# Patient Record
Sex: MALE | Race: AMERICAN INDIAN OR ALASKA NATIVE
[De-identification: names, ages, dates, MRNs, and addresses within clinical notes are randomized per-mention and may not be internally consistent; named-entity substitution may affect disease eponyms.]

---

## 2019-07-07 ENCOUNTER — HOSPITAL ENCOUNTER (EMERGENCY)
Dept: HOSPITAL 95 - ER | Age: 44
Discharge: HOME | End: 2019-07-07
Payer: COMMERCIAL

## 2019-07-07 VITALS — BODY MASS INDEX: 44.43 KG/M2 | WEIGHT: 300.01 LBS | HEIGHT: 69 IN

## 2019-07-07 DIAGNOSIS — I10: ICD-10-CM

## 2019-07-07 DIAGNOSIS — L03.113: Primary | ICD-10-CM

## 2019-07-07 DIAGNOSIS — K21.9: ICD-10-CM

## 2019-07-07 DIAGNOSIS — Z79.899: ICD-10-CM

## 2019-07-25 ENCOUNTER — HOSPITAL ENCOUNTER (OUTPATIENT)
Dept: HOSPITAL 95 - ORSCMMR | Age: 44
Discharge: HOME | End: 2019-07-25
Attending: SURGERY
Payer: COMMERCIAL

## 2019-07-25 VITALS — HEIGHT: 69.02 IN | WEIGHT: 315 LBS | BODY MASS INDEX: 46.65 KG/M2

## 2019-07-25 DIAGNOSIS — G47.33: ICD-10-CM

## 2019-07-25 DIAGNOSIS — K42.0: Primary | ICD-10-CM

## 2019-07-25 DIAGNOSIS — E66.9: ICD-10-CM

## 2019-07-25 DIAGNOSIS — K21.9: ICD-10-CM

## 2019-07-25 DIAGNOSIS — E66.01: ICD-10-CM

## 2019-07-25 DIAGNOSIS — Z79.899: ICD-10-CM

## 2019-07-25 DIAGNOSIS — I10: ICD-10-CM

## 2019-07-25 PROCEDURE — 0WUF0JZ SUPPLEMENT ABDOMINAL WALL WITH SYNTHETIC SUBSTITUTE, OPEN APPROACH: ICD-10-PCS | Performed by: SURGERY

## 2019-07-25 PROCEDURE — C1781 MESH (IMPLANTABLE): HCPCS

## 2019-07-25 NOTE — NUR
History, Chart, Medications and Allergies reviewed before start of
procedure.Ambulatory in Day Surgery
Patient confirms NPO status and agrees with scheduled surgery.
Patient reports completing Chlorhexadine shower X2 prior to admission to
hospital.Surgical site prepped with 2% Chlorhexidine cloth wipe.

## 2019-07-25 NOTE — NUR
RECIEVED REPORT FROM WILLIAM HILARIO. VSS DRESSING [facility]. DENIES PAIN AND NEED FOR
DRINK AT THIS TIME. STATES FEELS TIRED.